# Patient Record
Sex: MALE | Race: WHITE
[De-identification: names, ages, dates, MRNs, and addresses within clinical notes are randomized per-mention and may not be internally consistent; named-entity substitution may affect disease eponyms.]

---

## 2018-05-03 ENCOUNTER — HOSPITAL ENCOUNTER (EMERGENCY)
Dept: HOSPITAL 68 - ERH | Age: 23
LOS: 1 days | End: 2018-05-04
Payer: COMMERCIAL

## 2018-05-03 VITALS — WEIGHT: 200 LBS | HEIGHT: 67 IN | BODY MASS INDEX: 31.39 KG/M2
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
## 2018-05-03 DIAGNOSIS — S91.331A: ICD-10-CM

## 2018-05-03 DIAGNOSIS — S91.332A: Primary | ICD-10-CM

## 2018-05-04 VITALS — DIASTOLIC BLOOD PRESSURE: 92 MMHG | SYSTOLIC BLOOD PRESSURE: 151 MMHG

## 2018-05-04 NOTE — ED ANKLE/FOOT INJURY COMPLAINT
History of Present Illness
 
General
Chief Complaint: General Adult
Stated Complaint: STEPED ON TWO NAILS BILAT FEET ? TETANUS
Source: patient, old records, friend
Exam Limitations: no limitations
 
Vital Signs & Intake/Output
Vital Signs & Intake/Output
 Vital Signs
 
 
Date Time Temp Pulse Resp B/P B/P Pulse O2 O2 Flow FiO2
 
     Mean Ox Delivery Rate 
 
05/04 0005 98.0 96 18 151/92  98 Room Air  
 
 
 
Allergies
Coded Allergies:
No Known Allergies (05/04/18)
 
Triage Note:
TRIAGE: PATIENT TO ER FROM HOME REPORTING STEPPED
 ON NAIL ON EACH FOOT YESTERDAY. NOT UTD W/ TETNAUS
 R FOOT PAIN W/ STANDING 8/10 AND R FOOT INCREASED
 THROUGHOUT TODAY.
Triage Nurses Notes Reviewed? yes
Occurred: yesterday
Duration: day(s):, constant, continues in ED
Timing: recent history
Severity: moderate
Pain/Injury Location:
Bilateral: Foot. 
Method of Injury: Nail puncture wound wearing sneakers
Modifying Factors:
Worsens With: other (weightbearing). 
Associated Symptoms: GCS 15 since
HPI:
1 day prior to admission patient stepped on 2 different nails while wearing 
sneakers.  He complains of pain when weightbearing.
He denies fever chills nausea vomiting diarrhea abdominal pain chest pain 
shortness breath headache dysuria rash bleeding.
 
Past History
 
Travel History
Traveled to [place] past 21 day No
 
Medical History
Any Pertinent Medical History? none
Neurological: NONE
EENT: NONE
Cardiovascular: NONE
Respiratory: NONE
Gastrointestinal: NONE
Hepatic: NONE
Renal: NONE
Musculoskeletal: NONE
Psychiatric: NONE
Endocrine: NONE
Blood Disorders: NONE
Cancer(s): NONE
GYN/Reproductive: NONE
 
Surgical History
Surgical History: non-contributory
 
Psychosocial History
What is your primary language English
Tobacco Use: Never used
 
Family History
Hx Contributory? No
 
Review of Systems
 
Review of Systems
Constitutional:
Reports: no symptoms. 
EENTM:
Reports: no symptoms. 
Respiratory:
Reports: no symptoms. 
Cardiovascular:
Reports: no symptoms. 
GI:
Reports: no symptoms. 
Genitourinary:
Reports: no symptoms. 
Musculoskeletal:
Reports: see HPI. 
Skin:
Reports: see HPI. 
Neurological/Psychological:
Reports: no symptoms. 
Hematologic/Endocrine:
Reports: no symptoms. 
Immunologic/Allergic:
Reports: no symptoms. 
All Other Systems: Reviewed and Negative
 
Physical Exam
 
Physical Exam
General Appearance: well developed/nourished, alert, awake, anxious, mild 
distress
Head: atraumatic, normal appearance
Eyes:
Bilateral: normal appearance, PERRL, EOMI. 
Ears, Nose, Throat: normal pharynx, normal ENT inspection, hearing grossly 
normal
Neck: normal inspection, supple, full range of motion, no midline tenderness
Cardiovascular/Respiratory: normal breath sounds, normal peripheral pulses, 
regular rate/rhythm, no respiratory distress
Back: normal inspection, normal range of motion
Leg/Knee/Thigh Left: normal range of motion, normal inspection
Leg/Knee/Thigh Right: normal range of motion, normal inspection
Ankle Left: normal inspection, normal range of motion
Ankle Right: normal inspection, normal range of motion
Foot Left: normal range of motion, pain, soft tissue tenderness, puncture wound 
without active bleeding erythema discharge
Foot Right: pain, soft tissue tenderness, puncture wound without active bleeding
erythema discharge
Reflexes:
2+: knee (R), knee (L). 
Neuro/Vascular: normal motor function, normal sensation
Tendon: normal tendon function
Psychiatric: awake, alert, oriented x 3
Skin: intact, normal color, warm/dry
 
Progress
Differential Diagnosis: cellulitis
Plan of Care:
 Current Medications
 
 
  Sig/Oswaldo Start time  Last
 
Medication Dose  Stop Time Status Admin
 
Ciprofloxacin 500 MG ONCE ONE 05/04 0015 UNVr 
 
(Cipro)   05/04 0016  
 
Ibuprofen 600 MG ONCE ONE 05/04 0015 UNVr 
 
(Motrin)   05/04 0016  
 
Tetanus/Diphtheria  0.5 ML ONCE ONE 05/04 0015 UNVr 
 
Toxoids Adsorbed   05/04 0016  
 
(Decavac)     
 
 
 
 
Departure
 
Departure
Time of Disposition: 0014
Disposition: HOME OR SELF CARE
Condition: Stable
Clinical Impression
Primary Impression: Puncture wound of foot without foreign body
Referrals:
Patient Has No Primary Care Dr (PCP/Family)
 
Departure Forms:
Customer Survey
General Discharge Information
Prescriptions:
Current Visit Scripts
Ciprofloxacin HCl (Cipro) 1 TAB PO BID  
     #14 TAB 
 
Ibuprofen 1 TAB PO Q6P  
     #30 TAB 
     with food